# Patient Record
Sex: FEMALE | Race: WHITE
[De-identification: names, ages, dates, MRNs, and addresses within clinical notes are randomized per-mention and may not be internally consistent; named-entity substitution may affect disease eponyms.]

---

## 2017-06-24 ENCOUNTER — HEALTH MAINTENANCE LETTER (OUTPATIENT)
Age: 32
End: 2017-06-24

## 2018-01-30 ENCOUNTER — TELEPHONE (OUTPATIENT)
Dept: OBGYN | Facility: CLINIC | Age: 33
End: 2018-01-30

## 2018-01-30 ENCOUNTER — OFFICE VISIT (OUTPATIENT)
Dept: OBGYN | Facility: CLINIC | Age: 33
End: 2018-01-30

## 2018-01-30 VITALS
BODY MASS INDEX: 34.55 KG/M2 | HEART RATE: 68 BPM | DIASTOLIC BLOOD PRESSURE: 66 MMHG | SYSTOLIC BLOOD PRESSURE: 110 MMHG | WEIGHT: 195 LBS | HEIGHT: 63 IN

## 2018-01-30 DIAGNOSIS — N97.1 TUBAL OCCLUSION: Primary | ICD-10-CM

## 2018-01-30 PROCEDURE — 99499 UNLISTED E&M SERVICE: CPT | Performed by: OBSTETRICS & GYNECOLOGY

## 2018-01-30 ASSESSMENT — PATIENT HEALTH QUESTIONNAIRE - PHQ9: 5. POOR APPETITE OR OVEREATING: NOT AT ALL

## 2018-01-30 ASSESSMENT — ANXIETY QUESTIONNAIRES
GAD7 TOTAL SCORE: 0
5. BEING SO RESTLESS THAT IT IS HARD TO SIT STILL: NOT AT ALL
1. FEELING NERVOUS, ANXIOUS, OR ON EDGE: NOT AT ALL
IF YOU CHECKED OFF ANY PROBLEMS ON THIS QUESTIONNAIRE, HOW DIFFICULT HAVE THESE PROBLEMS MADE IT FOR YOU TO DO YOUR WORK, TAKE CARE OF THINGS AT HOME, OR GET ALONG WITH OTHER PEOPLE: NOT DIFFICULT AT ALL
3. WORRYING TOO MUCH ABOUT DIFFERENT THINGS: NOT AT ALL
2. NOT BEING ABLE TO STOP OR CONTROL WORRYING: NOT AT ALL
7. FEELING AFRAID AS IF SOMETHING AWFUL MIGHT HAPPEN: NOT AT ALL
6. BECOMING EASILY ANNOYED OR IRRITABLE: NOT AT ALL

## 2018-01-30 NOTE — TELEPHONE ENCOUNTER
HSG to be scheduled for Tubal Occlusion    Spoke with Aundrea at [facility]  2/13/2018 12pm [facility] Check In clinic 11:40am    EPIC updated by Angeles VIDES updated by Carol Damon updated by Carol Lange  Surgery Scheduler

## 2018-01-30 NOTE — PROGRESS NOTES
SUBJECTIVE:                                                   Lillian Cardona is a 32 year old female who presents to clinic today for the following health issue(s):  Patient presents with:  Consult: Discuss getting a tubal reversal- referred by OBGYEVAN Araujo- interested in conceiving      HPI: The patient is a 32-year-old  3 with 2  sections and one vaginal delivery.  Who underwent a laparoscopic tubal ligation with Filshie clips.  She subsequently  and is now remarrying and would like to investigate the possibility of reversal.  Her general health is good although she is a little overweight at this time and dieting diligently.      No LMP recorded. Patient is not currently having periods (Reason: Tubal )..   Patient is sexually active, .  Using tubal ligation for contraception.    reports that she has quit smoking. She smoked 0.20 packs per day. She has never used smokeless tobacco.    STD testing offered?  Declined    Health maintenance updated:  yes    Today's PHQ-9 Score:   PHQ-9 SCORE 2018   Total Score 0     Today's JERAD-7 Score:   JERAD-7 SCORE 2018   Total Score 0       Problem list and histories reviewed & adjusted, as indicated.  Additional history: as documented.    Patient Active Problem List   Diagnosis     CARDIOVASCULAR SCREENING; LDL GOAL LESS THAN 160     Past Surgical History:   Procedure Laterality Date     APPENDECTOMY  2006      SECTION  ,      MAMMOPLASTY AUGMENTATION  , 2008     SINUS SURGERY  ,       Social History   Substance Use Topics     Smoking status: Former Smoker     Packs/day: 0.20     Smokeless tobacco: Never Used      Comment: Quit      Alcohol use No      Problem (# of Occurrences) Relation (Name,Age of Onset)    HEART DISEASE (1) Maternal Grandmother    Hypertension (1) Mother            No current outpatient prescriptions on file.     No current facility-administered medications for this visit.   "    Allergies   Allergen Reactions     Demerol Hcl [Meperidine Hcl] Nausea     Demerol      No Clinical Screening - See Comments Nausea and Vomiting     Contrast Dye Nausea and Vomiting     Patient had omnipaque 350 on 5-23-17 with no pretreat and did fine, no reaction.     Latex Rash     Meperidine Hives and Rash     Other reaction(s): Swelling       ROS:  12 point review of systems negative other than symptoms noted below.    OBJECTIVE:     /66  Pulse 68  Ht 5' 3\" (1.6 m)  Wt 195 lb (88.5 kg)  BMI 34.54 kg/m2  Body mass index is 34.54 kg/(m^2).    Exam:  Constitutional:  Appearance: Well nourished, well developed alert, in no acute distress  No Pelvic Exam performed today. Recent yearly     In-Clinic Test Results:    ASSESSMENT/PLAN:                                                      We reviewed the patient's operative note and I am very optimistic that very little tubal damage has occurred due to the use of Filshie clips.  We discussed face to workup which is a hysterosalpingogram to look at proximal segments.  If that is optimal we would then proceed to laparoscopy possible laparotomy for reanastomosis when it is convenient for her work.  She understands the risks and complications of both the workup and the surgery as well as the success rate of 80-85%.  She understands there are no guarantees from the surgery of future fertility.          Beni Bellamy MD  [facility] [place]  "

## 2018-01-30 NOTE — MR AVS SNAPSHOT
"              After Visit Summary   2018    Lillian Cardona    MRN: [id number]           Patient Information     Date Of Birth          1985        Visit Information        Provider Department      2018 2:45 PM Beni Bellamy MD [facility]        Today's Diagnoses     Tubal occlusion    -  1       Follow-ups after your visit        Who to contact     If you have questions or need follow up information about today's clinic visit or your schedule please contact [facility] directly at [phone].  Normal or non-critical lab and imaging results will be communicated to you by [employer], letter or phone within 4 business days after the clinic has received the results. If you do not hear from us within 7 days, please contact the clinic through [employer] or phone. If you have a critical or abnormal lab result, we will notify you by phone as soon as possible.  Submit refill requests through [employer] or call your pharmacy and they will forward the refill request to us. Please allow 3 business days for your refill to be completed.          Additional Information About Your Visit        MyChart Information     [employer]Raven lets you send messages to your doctor, view your test results, renew your prescriptions, schedule appointments and more. To sign up, go to [email or web address] . Click on \"Log in\" on the left side of the screen, which will take you to the Welcome page. Then click on \"Sign up Now\" on the right side of the page.     You will be asked to enter the access code listed below, as well as some personal information. Please follow the directions to create your username and password.     Your access code is: [email or web address]  Expires: 2018  2:55 PM     Your access code will  in 90 days. If you need help or a new code, please call your [place] clinic or [phone].        Care EveryWhere ID     This is your Care EveryWhere ID. This could be used by other " "organizations to access your [place] medical records  [id number]        Your Vitals Were     Pulse Height BMI (Body Mass Index)             68 5' 3\" (1.6 m) 34.54 kg/m2          Blood Pressure from Last 3 Encounters:   01/30/18 110/66   11/01/12 129/79   01/13/10 133/93    Weight from Last 3 Encounters:   01/30/18 195 lb (88.5 kg)   01/13/10 154 lb (69.9 kg)              Today, you had the following     No orders found for display       Primary Care Provider Office Phone # Fax #    Lupillo Christiansen [phone]       XXX RESIGNED XXX [id number] [place] [facility] [address]        Equal Access to Services     MICAELA [place]ST[place] : Hadii aad papito hadasho Soomaali, waaxda luqadaha, qaybta kaalmada adeegyada, waxay idiin haygeri perez . So [facility] [phone].    ATENCIÓN: Si habla español, tiene a valentin disposición servicios gratuitos de asistencia lingüística. Llame al [phone].    We comply with applicable federal civil rights laws and [place] laws. We do not discriminate on the basis of race, color, national origin, age, disability, sex, sexual orientation, or gender identity.            Thank you!     Thank you for choosing [facility] [place]  for your care. Our goal is always to provide you with excellent care. Hearing back from our patients is one way we can continue to improve our services. Please take a few minutes to complete the written survey that you may receive in the mail after your visit with us. Thank you!             Your Updated Medication List - Protect others around you: Learn how to safely use, store and throw away your medicines at www.disposemymeds.org.      Notice  As of 1/30/2018  2:55 PM    You have not been prescribed any medications.      "

## 2018-01-31 ASSESSMENT — ANXIETY QUESTIONNAIRES: GAD7 TOTAL SCORE: 0

## 2018-01-31 ASSESSMENT — PATIENT HEALTH QUESTIONNAIRE - PHQ9: SUM OF ALL RESPONSES TO PHQ QUESTIONS 1-9: 0

## 2018-02-13 ENCOUNTER — OFFICE VISIT (OUTPATIENT)
Dept: OBGYN | Facility: CLINIC | Age: 33
End: 2018-02-13

## 2018-02-13 ENCOUNTER — TRANSFERRED RECORDS (OUTPATIENT)
Dept: HEALTH INFORMATION MANAGEMENT | Facility: CLINIC | Age: 33
End: 2018-02-13

## 2018-02-13 ENCOUNTER — TELEPHONE (OUTPATIENT)
Dept: OBGYN | Facility: CLINIC | Age: 33
End: 2018-02-13

## 2018-02-13 DIAGNOSIS — N97.1 TUBAL OCCLUSION: Primary | ICD-10-CM

## 2018-02-13 PROCEDURE — 58340 CATHETER FOR HYSTEROGRAPHY: CPT | Performed by: OBSTETRICS & GYNECOLOGY

## 2018-02-13 NOTE — PROGRESS NOTES
The patient is seen at this time for second stage workup of possible tubal reanastomosis.  She is consented for a hysterosalpingogram after all risks and complications were reviewed.  She is placed in the dorsolithotomy position on the fluoroscopy table.  A speculum was placed and the entire vagina and cervix are prepped.  A single-tooth tenaculum was placed on the anterior cervical lip.  A Scott cannula was placed and under fluoroscopic control dye was injected.  Normal cavity appeared.  The patient had at least 4 cm of 2 bleeding up to clips on each side.  The Filshie clips were clearly identified.  The results were shared with the patient.  She tolerated this well and will discuss third phase workup with us when appropriate.

## 2018-02-13 NOTE — TELEPHONE ENCOUNTER
FROM VERBAL AS NO SX REQUEST IN PT CHART    TUBAL REVERSAL  DX Tubal Occlusion    Patient surgery scheduled on 3/27/2018 at 7:20am Check in 6am  Location for surgery to performed:   Surgery Outpatient  Scheduled by Keegan 2/13/2018     Information Packet given :Yes: HANDED 2/13/2018    CPT codes given: No            Consents signed? N/A  Rep Informed :N/A    PREOP DATE :  3/20/2018  In Epic :Yes    On Spreadsheet :Yes    On Calendar EB  :Yes    In [place] Calendar JOSHUA  :No    Assist NONE   Assist in Epic NA  Assist Notified as needed :No     Carol Lange  Surgery Scheduler

## 2018-02-13 NOTE — MR AVS SNAPSHOT
"              After Visit Summary   2/13/2018    Lillian Cardona    MRN: [id number]           Patient Information     Date Of Birth          1985        Visit Information        Provider Department      2/13/2018 12:00 PM Beni Bellamy MD [facility]        Today's Diagnoses     Tubal occlusion    -  1       Follow-ups after your visit        Your next 10 appointments already scheduled     Mar 20, 2018  1:45 PM CDT   SHORT with Beni Bellamy MD   [facility] [place] ([facility])    [address]  [address]  [place] [id number] [phone]            Mar 27, 2018   Procedure with Beni Bellamy MD   [facility] (--)    [address]  [facility] [id number] [phone]              Who to contact     If you have questions or need follow up information about today's clinic visit or your schedule please contact [facility] directly at [phone].  Normal or non-critical lab and imaging results will be communicated to you by [employer], letter or phone within 4 business days after the clinic has received the results. If you do not hear from us within 7 days, please contact the clinic through [employer] or phone. If you have a critical or abnormal lab result, we will notify you by phone as soon as possible.  Submit refill requests through [employer] or call your pharmacy and they will forward the refill request to us. Please allow 3 business days for your refill to be completed.          Additional Information About Your Visit        [employer] Information     [employer] lets you send messages to your doctor, view your test results, renew your prescriptions, schedule appointments and more. To sign up, go to [email or web address] . Click on \"Log in\" on the left side of the screen, which will take you to the Welcome page. Then click on \"Sign up Now\" on the right side of the page.     You will be " asked to enter the access code listed below, as well as some personal information. Please follow the directions to create your username and password.     Your access code is: [email or web address]  Expires: 2018  2:55 PM     Your access code will  in 90 days. If you need help or a new code, please call your [place] clinic or [phone].        Care EveryWhere ID     This is your Care EveryWhere ID. This could be used by other organizations to access your [place] medical records  [id number]         Blood Pressure from Last 3 Encounters:   18 110/66   12 129/79   01/13/10 133/93    Weight from Last 3 Encounters:   18 195 lb (88.5 kg)   01/13/10 154 lb (69.9 kg)              Today, you had the following     No orders found for display       Primary Care Provider Office Phone # Fax #    Lupillo Christiansen [phone]       XXX RESIGNED XXX [address]  [facility] [address]        Equal Access to Services     [facility]: Hadii aad ku hadasho Soomaali, waaxda luqadaha, qaybta kaalmada adeegyada, lefty perez . So [facility] [phone].    ATENCIÓN: Si habla español, tiene a valentin disposición servicios gratuitos de asistencia lingüística. Gerson al [phone].    We comply with applicable federal civil rights laws and [place] laws. We do not discriminate on the basis of race, color, national origin, age, disability, sex, sexual orientation, or gender identity.            Thank you!     Thank you for choosing [facility] [place]  for your care. Our goal is always to provide you with excellent care. Hearing back from our patients is one way we can continue to improve our services. Please take a few minutes to complete the written survey that you may receive in the mail after your visit with us. Thank you!             Your Updated Medication List - Protect others around you: Learn how to safely use, store and throw away your medicines at  www.disposemymeds.org.      Notice  As of 2/13/2018 12:27 PM    You have not been prescribed any medications.

## 2018-03-13 ENCOUNTER — OFFICE VISIT (OUTPATIENT)
Dept: OBGYN | Facility: CLINIC | Age: 33
End: 2018-03-13

## 2018-03-13 VITALS
HEART RATE: 64 BPM | WEIGHT: 187 LBS | BODY MASS INDEX: 33.13 KG/M2 | SYSTOLIC BLOOD PRESSURE: 108 MMHG | DIASTOLIC BLOOD PRESSURE: 72 MMHG | HEIGHT: 63 IN

## 2018-03-13 DIAGNOSIS — Z01.812 PRE-OPERATIVE LABORATORY EXAMINATION: Primary | ICD-10-CM

## 2018-03-13 DIAGNOSIS — N97.1 TUBAL OCCLUSION: Primary | ICD-10-CM

## 2018-03-13 LAB — HGB BLD-MCNC: 14.6 G/DL (ref 11.7–15.7)

## 2018-03-13 PROCEDURE — 85018 HEMOGLOBIN: CPT | Performed by: OBSTETRICS & GYNECOLOGY

## 2018-03-13 PROCEDURE — 36415 COLL VENOUS BLD VENIPUNCTURE: CPT | Performed by: OBSTETRICS & GYNECOLOGY

## 2018-03-13 PROCEDURE — 99499 UNLISTED E&M SERVICE: CPT | Performed by: OBSTETRICS & GYNECOLOGY

## 2018-03-13 NOTE — PROGRESS NOTES
SUBJECTIVE:                                                   Lillian Cardona is a 33 year old female who presents to clinic today for the following health issue(s):  Patient presents with:  Pre-Op Exam: scheduled for tubal reversal on 3/27      HPI: The patient is seen at this time for preoperative clearance for a laparoscopy possible laparotomy for reanastomosis.  Hysterosalpingogram has shown adequate proximal segments.  We reviewed her outside operative note and feel that she is a good candidate.  She understands risks and complications of the procedure as well as the patency rate.      Patient's last menstrual period was 2018 (approximate)..   Patient is sexually active, .  Using tubal ligation for contraception.    reports that she has quit smoking. She smoked 0.20 packs per day. She has never used smokeless tobacco.    STD testing offered?  Declined    Health maintenance updated:  yes    Today's PHQ-2 Score: No flowsheet data found.  Today's PHQ-9 Score:   PHQ-9 SCORE 2018   Total Score 0     Today's JERAD-7 Score:   JERAD-7 SCORE 2018   Total Score 0       Problem list and histories reviewed & adjusted, as indicated.  Additional history: as documented.    Patient Active Problem List   Diagnosis     CARDIOVASCULAR SCREENING; LDL GOAL LESS THAN 160     Past Surgical History:   Procedure Laterality Date     APPENDECTOMY  2006      SECTION  ,      MAMMOPLASTY AUGMENTATION  , 2008     SINUS SURGERY  ,       Social History   Substance Use Topics     Smoking status: Former Smoker     Packs/day: 0.20     Smokeless tobacco: Never Used      Comment: Quit      Alcohol use No      Problem (# of Occurrences) Relation (Name,Age of Onset)    HEART DISEASE (1) Maternal Grandmother    Hypertension (1) Mother            No current outpatient prescriptions on file.     No current facility-administered medications for this visit.      Allergies   Allergen Reactions  "    Demerol Hcl [Meperidine Hcl] Nausea     Demerol      No Clinical Screening - See Comments Nausea and Vomiting     Contrast Dye Nausea and Vomiting     Patient had omnipaque 350 on 5-23-17 with no pretreat and did fine, no reaction.     Latex Rash     Meperidine Hives and Rash     Other reaction(s): Swelling         OBJECTIVE:     /72  Pulse 64  Ht 5' 3\" (1.6 m)  Wt 187 lb (84.8 kg)  LMP 03/06/2018 (Approximate)  BMI 33.13 kg/m2  Body mass index is 33.13 kg/(m^2).    Exam:  Constitutional:  Appearance: Well nourished, well developed alert, in no acute distress  Neck:  Lymph Nodes:  No lymphadenopathy present; Thyroid:  Gland size normal, nontender, no nodules or masses present on palpation  Chest:  Respiratory Effort:  Breathing unlabored  Cardiovascular: Heart: Auscultation:  Regular rate, normal rhythm, no murmurs present  Gastrointestinal:  Abdominal Examination:  Abdomen nontender to palpation, tone normal without rigidity or guarding, no masses present, umbilicus without lesions; Liver/Spleen:  No hepatomegaly present, liver nontender to palpation; Hernias:  No hernias present  Lymphatic: Lymph Nodes:  No other lymphadenopathy present  Skin:General Inspection:  No rashes present, no lesions present, no areas of discoloration; Genitalia and Groin:  No rashes present, no lesions present, no areas of discoloration, no masses present.  Neurologic/Psychiatric:  Mental Status:  Oriented X3   No Pelvic Exam performed     In-Clinic Test Results:  Results for orders placed or performed in visit on 03/13/18 (from the past 24 hour(s))   Hemoglobin   Result Value Ref Range    Hemoglobin 14.6 11.7 - 15.7 g/dL       ASSESSMENT/PLAN:                                                        The patient is in excellent health and is well consented for the procedure.  She is to call if she has any issues with cold or flu between now and surgery date.          Beni Bellamy MD  [facility]  "

## 2018-03-13 NOTE — MR AVS SNAPSHOT
"              After Visit Summary   3/13/2018    Lillian Cardona    MRN: [id number]           Patient Information     Date Of Birth          1985        Visit Information        Provider Department      3/13/2018 1:45 PM Beni Bellamy MD [facility]        Today's Diagnoses     Tubal occlusion    -  1       Follow-ups after your visit        Your next 10 appointments already scheduled     Mar 27, 2018   Procedure with Beni Bellamy MD   [facility] (--)    [address]  [facility] [id number] [phone]              Who to contact     If you have questions or need follow up information about today's clinic visit or your schedule please contact [facility] directly at [phone].  Normal or non-critical lab and imaging results will be communicated to you by MyChart, letter or phone within 4 business days after the clinic has received the results. If you do not hear from us within 7 days, please contact the clinic through MyChart or phone. If you have a critical or abnormal lab result, we will notify you by phone as soon as possible.  Submit refill requests through [employer] or call your pharmacy and they will forward the refill request to us. Please allow 3 business days for your refill to be completed.          Additional Information About Your Visit        MyChart Information     [employer]isha lets you send messages to your doctor, view your test results, renew your prescriptions, schedule appointments and more. To sign up, go to [email or web address] . Click on \"Log in\" on the left side of the screen, which will take you to the Welcome page. Then click on \"Sign up Now\" on the right side of the page.     You will be asked to enter the access code listed below, as well as some personal information. Please follow the directions to create your username and password.     Your access code is: [email or web address]  Expires: 4/30/2018  3:55 " "PM     Your access code will  in 90 days. If you need help or a new code, please call your [place] clinic or [phone].        Care EveryWhere ID     This is your Care EveryWhere ID. This could be used by other organizations to access your [place] medical records  [id number]        Your Vitals Were     Pulse Height Last Period BMI (Body Mass Index)          64 5' 3\" (1.6 m) 2018 (Approximate) 33.13 kg/m2         Blood Pressure from Last 3 Encounters:   18 108/72   18 110/66   12 129/79    Weight from Last 3 Encounters:   18 187 lb (84.8 kg)   18 195 lb (88.5 kg)   01/13/10 154 lb (69.9 kg)              Today, you had the following     No orders found for display       Primary Care Provider Office Phone # Fax #    Lupillo Christiansen [phone]       XXX RESIGNED XXX [address]  [facility] [address]        Equal Access to Services     [facility]: Hadii aad ku hadasho Soomaali, waaxda luqadaha, qaybta kaalmada adeegyagorge, lefty perez . So [facility] [phone].    ATENCIÓN: Si habla español, tiene a valentin disposición servicios gratuitos de asistencia lingüística. Gerson al [phone].    We comply with applicable federal civil rights laws and [place] laws. We do not discriminate on the basis of race, color, national origin, age, disability, sex, sexual orientation, or gender identity.            Thank you!     Thank you for choosing [facility] [place]  for your care. Our goal is always to provide you with excellent care. Hearing back from our patients is one way we can continue to improve our services. Please take a few minutes to complete the written survey that you may receive in the mail after your visit with us. Thank you!             Your Updated Medication List - Protect others around you: Learn how to safely use, store and throw away your medicines at www.disposemymeds.org.      Notice  As of " 3/13/2018  2:24 PM    You have not been prescribed any medications.

## 2018-03-22 NOTE — H&P (VIEW-ONLY)
SUBJECTIVE:                                                   Lillian Cardona is a 33 year old female who presents to clinic today for the following health issue(s):  Patient presents with:  Pre-Op Exam: scheduled for tubal reversal on 3/27      HPI: The patient is seen at this time for preoperative clearance for a laparoscopy possible laparotomy for reanastomosis.  Hysterosalpingogram has shown adequate proximal segments.  We reviewed her outside operative note and feel that she is a good candidate.  She understands risks and complications of the procedure as well as the patency rate.      Patient's last menstrual period was 2018 (approximate)..   Patient is sexually active, .  Using tubal ligation for contraception.    reports that she has quit smoking. She smoked 0.20 packs per day. She has never used smokeless tobacco.    STD testing offered?  Declined    Health maintenance updated:  yes    Today's PHQ-2 Score: No flowsheet data found.  Today's PHQ-9 Score:   PHQ-9 SCORE 2018   Total Score 0     Today's JERAD-7 Score:   JERAD-7 SCORE 2018   Total Score 0       Problem list and histories reviewed & adjusted, as indicated.  Additional history: as documented.    Patient Active Problem List   Diagnosis     CARDIOVASCULAR SCREENING; LDL GOAL LESS THAN 160     Past Surgical History:   Procedure Laterality Date     APPENDECTOMY  2006      SECTION  ,      MAMMOPLASTY AUGMENTATION  , 2008     SINUS SURGERY  ,       Social History   Substance Use Topics     Smoking status: Former Smoker     Packs/day: 0.20     Smokeless tobacco: Never Used      Comment: Quit      Alcohol use No      Problem (# of Occurrences) Relation (Name,Age of Onset)    HEART DISEASE (1) Maternal Grandmother    Hypertension (1) Mother            No current outpatient prescriptions on file.     No current facility-administered medications for this visit.      Allergies   Allergen Reactions  "    Demerol Hcl [Meperidine Hcl] Nausea     Demerol      No Clinical Screening - See Comments Nausea and Vomiting     Contrast Dye Nausea and Vomiting     Patient had omnipaque 350 on 5-23-17 with no pretreat and did fine, no reaction.     Latex Rash     Meperidine Hives and Rash     Other reaction(s): Swelling         OBJECTIVE:     /72  Pulse 64  Ht 5' 3\" (1.6 m)  Wt 187 lb (84.8 kg)  LMP 03/06/2018 (Approximate)  BMI 33.13 kg/m2  Body mass index is 33.13 kg/(m^2).    Exam:  Constitutional:  Appearance: Well nourished, well developed alert, in no acute distress  Neck:  Lymph Nodes:  No lymphadenopathy present; Thyroid:  Gland size normal, nontender, no nodules or masses present on palpation  Chest:  Respiratory Effort:  Breathing unlabored  Cardiovascular: Heart: Auscultation:  Regular rate, normal rhythm, no murmurs present  Gastrointestinal:  Abdominal Examination:  Abdomen nontender to palpation, tone normal without rigidity or guarding, no masses present, umbilicus without lesions; Liver/Spleen:  No hepatomegaly present, liver nontender to palpation; Hernias:  No hernias present  Lymphatic: Lymph Nodes:  No other lymphadenopathy present  Skin:General Inspection:  No rashes present, no lesions present, no areas of discoloration; Genitalia and Groin:  No rashes present, no lesions present, no areas of discoloration, no masses present.  Neurologic/Psychiatric:  Mental Status:  Oriented X3   No Pelvic Exam performed     In-Clinic Test Results:  Results for orders placed or performed in visit on 03/13/18 (from the past 24 hour(s))   Hemoglobin   Result Value Ref Range    Hemoglobin 14.6 11.7 - 15.7 g/dL       ASSESSMENT/PLAN:                                                        The patient is in excellent health and is well consented for the procedure.  She is to call if she has any issues with cold or flu between now and surgery date.          Beni Bellamy MD  [facility]  "

## 2018-03-23 ENCOUNTER — ANESTHESIA EVENT (OUTPATIENT)
Dept: SURGERY | Facility: CLINIC | Age: 33
End: 2018-03-23

## 2018-03-26 RX ORDER — PHENAZOPYRIDINE HYDROCHLORIDE 200 MG/1
200 TABLET, FILM COATED ORAL ONCE
Status: CANCELLED | OUTPATIENT
Start: 2018-03-26 | End: 2018-03-26

## 2018-03-27 ENCOUNTER — HOSPITAL ENCOUNTER (OUTPATIENT)
Facility: CLINIC | Age: 33
Discharge: HOME | End: 2018-03-27
Attending: OBSTETRICS & GYNECOLOGY | Admitting: OBSTETRICS & GYNECOLOGY

## 2018-03-27 ENCOUNTER — NURSE TRIAGE (OUTPATIENT)
Dept: NURSING | Facility: CLINIC | Age: 33
End: 2018-03-27

## 2018-03-27 ENCOUNTER — ANESTHESIA (OUTPATIENT)
Dept: SURGERY | Facility: CLINIC | Age: 33
End: 2018-03-27

## 2018-03-27 ENCOUNTER — SURGERY (OUTPATIENT)
Age: 33
End: 2018-03-27

## 2018-03-27 VITALS
TEMPERATURE: 98.7 F | HEIGHT: 64 IN | SYSTOLIC BLOOD PRESSURE: 98 MMHG | RESPIRATION RATE: 16 BRPM | WEIGHT: 192 LBS | BODY MASS INDEX: 32.78 KG/M2 | DIASTOLIC BLOOD PRESSURE: 61 MMHG | OXYGEN SATURATION: 99 %

## 2018-03-27 DIAGNOSIS — N97.1 TUBAL OCCLUSION: Primary | ICD-10-CM

## 2018-03-27 LAB
B-HCG SERPL-ACNC: <1 IU/L (ref 0–5)
COPATH REPORT: NORMAL

## 2018-03-27 PROCEDURE — 88300 SURGICAL PATH GROSS: CPT | Performed by: OBSTETRICS & GYNECOLOGY

## 2018-03-27 PROCEDURE — 25000128 H RX IP 250 OP 636: Performed by: OBSTETRICS & GYNECOLOGY

## 2018-03-27 PROCEDURE — 25000128 H RX IP 250 OP 636

## 2018-03-27 PROCEDURE — 40000170 ZZH STATISTIC PRE-PROCEDURE ASSESSMENT II: Performed by: OBSTETRICS & GYNECOLOGY

## 2018-03-27 PROCEDURE — 37000008 ZZH ANESTHESIA TECHNICAL FEE, 1ST 30 MIN: Performed by: OBSTETRICS & GYNECOLOGY

## 2018-03-27 PROCEDURE — 36000058 ZZH SURGERY LEVEL 3 EA 15 ADDTL MIN: Performed by: OBSTETRICS & GYNECOLOGY

## 2018-03-27 PROCEDURE — 36000056 ZZH SURGERY LEVEL 3 1ST 30 MIN: Performed by: OBSTETRICS & GYNECOLOGY

## 2018-03-27 PROCEDURE — 25000128 H RX IP 250 OP 636: Performed by: ANESTHESIOLOGY

## 2018-03-27 PROCEDURE — 25000566 ZZH SEVOFLURANE, EA 15 MIN: Performed by: OBSTETRICS & GYNECOLOGY

## 2018-03-27 PROCEDURE — 84702 CHORIONIC GONADOTROPIN TEST: CPT | Performed by: ANESTHESIOLOGY

## 2018-03-27 PROCEDURE — 27210794 ZZH OR GENERAL SUPPLY STERILE: Performed by: OBSTETRICS & GYNECOLOGY

## 2018-03-27 PROCEDURE — 88300 SURGICAL PATH GROSS: CPT | Mod: 26 | Performed by: OBSTETRICS & GYNECOLOGY

## 2018-03-27 PROCEDURE — 71000012 ZZH RECOVERY PHASE 1 LEVEL 1 FIRST HR: Performed by: OBSTETRICS & GYNECOLOGY

## 2018-03-27 PROCEDURE — 36415 COLL VENOUS BLD VENIPUNCTURE: CPT | Performed by: ANESTHESIOLOGY

## 2018-03-27 PROCEDURE — 25000125 ZZHC RX 250: Performed by: OBSTETRICS & GYNECOLOGY

## 2018-03-27 PROCEDURE — 37000009 ZZH ANESTHESIA TECHNICAL FEE, EACH ADDTL 15 MIN: Performed by: OBSTETRICS & GYNECOLOGY

## 2018-03-27 PROCEDURE — 25000132 ZZH RX MED GY IP 250 OP 250 PS 637: Performed by: OBSTETRICS & GYNECOLOGY

## 2018-03-27 PROCEDURE — 71000013 ZZH RECOVERY PHASE 1 LEVEL 1 EA ADDTL HR: Performed by: OBSTETRICS & GYNECOLOGY

## 2018-03-27 PROCEDURE — 27211024 ZZHC OR SUPPLY OTHER OPNP: Performed by: OBSTETRICS & GYNECOLOGY

## 2018-03-27 PROCEDURE — 25000125 ZZHC RX 250

## 2018-03-27 PROCEDURE — 40000935 ZZH STATISTIC OUTPATIENT (NON-OBS) EVE

## 2018-03-27 PROCEDURE — 40000934 ZZH STATISTIC OUTPATIENT (NON-OBS) DAY

## 2018-03-27 PROCEDURE — 58750 REPAIR OVIDUCT: CPT | Performed by: OBSTETRICS & GYNECOLOGY

## 2018-03-27 RX ORDER — CEFAZOLIN SODIUM 2 G/100ML
2 INJECTION, SOLUTION INTRAVENOUS EVERY 8 HOURS
Status: DISCONTINUED | OUTPATIENT
Start: 2018-03-27 | End: 2018-03-28 | Stop reason: HOSPADM

## 2018-03-27 RX ORDER — MEPERIDINE HYDROCHLORIDE 25 MG/ML
12.5 INJECTION INTRAMUSCULAR; INTRAVENOUS; SUBCUTANEOUS
Status: DISCONTINUED | OUTPATIENT
Start: 2018-03-27 | End: 2018-03-27 | Stop reason: HOSPADM

## 2018-03-27 RX ORDER — SODIUM CHLORIDE, SODIUM LACTATE, POTASSIUM CHLORIDE, CALCIUM CHLORIDE 600; 310; 30; 20 MG/100ML; MG/100ML; MG/100ML; MG/100ML
INJECTION, SOLUTION INTRAVENOUS CONTINUOUS
Status: DISCONTINUED | OUTPATIENT
Start: 2018-03-27 | End: 2018-03-27 | Stop reason: HOSPADM

## 2018-03-27 RX ORDER — HYDROMORPHONE HYDROCHLORIDE 1 MG/ML
.3-.5 INJECTION, SOLUTION INTRAMUSCULAR; INTRAVENOUS; SUBCUTANEOUS EVERY 10 MIN PRN
Status: DISCONTINUED | OUTPATIENT
Start: 2018-03-27 | End: 2018-03-27 | Stop reason: HOSPADM

## 2018-03-27 RX ORDER — ONDANSETRON 2 MG/ML
4 INJECTION INTRAMUSCULAR; INTRAVENOUS EVERY 6 HOURS PRN
Status: DISCONTINUED | OUTPATIENT
Start: 2018-03-27 | End: 2018-03-28 | Stop reason: HOSPADM

## 2018-03-27 RX ORDER — PROPOFOL 10 MG/ML
INJECTION, EMULSION INTRAVENOUS CONTINUOUS PRN
Status: DISCONTINUED | OUTPATIENT
Start: 2018-03-27 | End: 2018-03-27

## 2018-03-27 RX ORDER — LIDOCAINE HYDROCHLORIDE 20 MG/ML
INJECTION, SOLUTION INFILTRATION; PERINEURAL PRN
Status: DISCONTINUED | OUTPATIENT
Start: 2018-03-27 | End: 2018-03-27

## 2018-03-27 RX ORDER — OXYCODONE AND ACETAMINOPHEN 5; 325 MG/1; MG/1
1-2 TABLET ORAL EVERY 4 HOURS PRN
Qty: 40 TABLET | Refills: 0 | Status: SHIPPED | OUTPATIENT
Start: 2018-03-27 | End: 2018-04-02

## 2018-03-27 RX ORDER — BUPIVACAINE HYDROCHLORIDE 2.5 MG/ML
INJECTION, SOLUTION EPIDURAL; INFILTRATION; INTRACAUDAL
Status: DISCONTINUED
Start: 2018-03-27 | End: 2018-03-27 | Stop reason: HOSPADM

## 2018-03-27 RX ORDER — BUPIVACAINE HYDROCHLORIDE 2.5 MG/ML
INJECTION, SOLUTION INFILTRATION; PERINEURAL PRN
Status: DISCONTINUED | OUTPATIENT
Start: 2018-03-27 | End: 2018-03-27 | Stop reason: HOSPADM

## 2018-03-27 RX ORDER — ONDANSETRON 4 MG/1
4 TABLET, ORALLY DISINTEGRATING ORAL EVERY 6 HOURS PRN
Status: DISCONTINUED | OUTPATIENT
Start: 2018-03-27 | End: 2018-03-28 | Stop reason: HOSPADM

## 2018-03-27 RX ORDER — NEOSTIGMINE METHYLSULFATE 1 MG/ML
VIAL (ML) INJECTION PRN
Status: DISCONTINUED | OUTPATIENT
Start: 2018-03-27 | End: 2018-03-27

## 2018-03-27 RX ORDER — GLYCOPYRROLATE 0.2 MG/ML
INJECTION, SOLUTION INTRAMUSCULAR; INTRAVENOUS PRN
Status: DISCONTINUED | OUTPATIENT
Start: 2018-03-27 | End: 2018-03-27

## 2018-03-27 RX ORDER — DEXAMETHASONE SODIUM PHOSPHATE 4 MG/ML
INJECTION, SOLUTION INTRA-ARTICULAR; INTRALESIONAL; INTRAMUSCULAR; INTRAVENOUS; SOFT TISSUE PRN
Status: DISCONTINUED | OUTPATIENT
Start: 2018-03-27 | End: 2018-03-27

## 2018-03-27 RX ORDER — PHYSOSTIGMINE SALICYLATE 1 MG/ML
1.2 INJECTION INTRAVENOUS
Status: DISCONTINUED | OUTPATIENT
Start: 2018-03-27 | End: 2018-03-27 | Stop reason: HOSPADM

## 2018-03-27 RX ORDER — LIDOCAINE HYDROCHLORIDE AND EPINEPHRINE 10; 10 MG/ML; UG/ML
INJECTION, SOLUTION INFILTRATION; PERINEURAL
Status: DISCONTINUED
Start: 2018-03-27 | End: 2018-03-27 | Stop reason: WASHOUT

## 2018-03-27 RX ORDER — HEPARIN SODIUM 1000 [USP'U]/ML
INJECTION, SOLUTION INTRAVENOUS; SUBCUTANEOUS
Status: DISCONTINUED
Start: 2018-03-27 | End: 2018-03-27 | Stop reason: HOSPADM

## 2018-03-27 RX ORDER — KETOROLAC TROMETHAMINE 30 MG/ML
30 INJECTION, SOLUTION INTRAMUSCULAR; INTRAVENOUS ONCE
Status: COMPLETED | OUTPATIENT
Start: 2018-03-27 | End: 2018-03-27

## 2018-03-27 RX ORDER — ACETAMINOPHEN 10 MG/ML
1000 INJECTION, SOLUTION INTRAVENOUS ONCE
Status: COMPLETED | OUTPATIENT
Start: 2018-03-27 | End: 2018-03-27

## 2018-03-27 RX ORDER — ONDANSETRON 4 MG/1
4 TABLET, FILM COATED ORAL EVERY 6 HOURS PRN
Qty: 10 TABLET | Refills: 0 | Status: SHIPPED | OUTPATIENT
Start: 2018-03-27 | End: 2018-04-09

## 2018-03-27 RX ORDER — CEFAZOLIN SODIUM 1 G
1 VIAL (EA) INJECTION SEE ADMIN INSTRUCTIONS
Status: DISCONTINUED | OUTPATIENT
Start: 2018-03-27 | End: 2018-03-27 | Stop reason: HOSPADM

## 2018-03-27 RX ORDER — FENTANYL CITRATE 50 UG/ML
INJECTION, SOLUTION INTRAMUSCULAR; INTRAVENOUS PRN
Status: DISCONTINUED | OUTPATIENT
Start: 2018-03-27 | End: 2018-03-27

## 2018-03-27 RX ORDER — LIDOCAINE 40 MG/G
CREAM TOPICAL
Status: DISCONTINUED | OUTPATIENT
Start: 2018-03-27 | End: 2018-03-28 | Stop reason: HOSPADM

## 2018-03-27 RX ORDER — ONDANSETRON 4 MG/1
4 TABLET, ORALLY DISINTEGRATING ORAL EVERY 30 MIN PRN
Status: DISCONTINUED | OUTPATIENT
Start: 2018-03-27 | End: 2018-03-27 | Stop reason: HOSPADM

## 2018-03-27 RX ORDER — OXYCODONE AND ACETAMINOPHEN 5; 325 MG/1; MG/1
1-2 TABLET ORAL EVERY 4 HOURS PRN
Status: DISCONTINUED | OUTPATIENT
Start: 2018-03-27 | End: 2018-03-28 | Stop reason: HOSPADM

## 2018-03-27 RX ORDER — KETOROLAC TROMETHAMINE 30 MG/ML
30 INJECTION, SOLUTION INTRAMUSCULAR; INTRAVENOUS ONCE
Status: DISCONTINUED | OUTPATIENT
Start: 2018-03-27 | End: 2018-03-28 | Stop reason: HOSPADM

## 2018-03-27 RX ORDER — ONDANSETRON 2 MG/ML
4 INJECTION INTRAMUSCULAR; INTRAVENOUS EVERY 30 MIN PRN
Status: DISCONTINUED | OUTPATIENT
Start: 2018-03-27 | End: 2018-03-27 | Stop reason: HOSPADM

## 2018-03-27 RX ORDER — CEFAZOLIN SODIUM 2 G/100ML
2 INJECTION, SOLUTION INTRAVENOUS
Status: COMPLETED | OUTPATIENT
Start: 2018-03-27 | End: 2018-03-27

## 2018-03-27 RX ORDER — ONDANSETRON 2 MG/ML
INJECTION INTRAMUSCULAR; INTRAVENOUS PRN
Status: DISCONTINUED | OUTPATIENT
Start: 2018-03-27 | End: 2018-03-27

## 2018-03-27 RX ORDER — HYDROMORPHONE HYDROCHLORIDE 1 MG/ML
0.2 INJECTION, SOLUTION INTRAMUSCULAR; INTRAVENOUS; SUBCUTANEOUS
Status: DISCONTINUED | OUTPATIENT
Start: 2018-03-27 | End: 2018-03-28 | Stop reason: HOSPADM

## 2018-03-27 RX ORDER — SODIUM CHLORIDE 9 MG/ML
INJECTION, SOLUTION INTRAVENOUS CONTINUOUS
Status: DISCONTINUED | OUTPATIENT
Start: 2018-03-27 | End: 2018-03-28 | Stop reason: HOSPADM

## 2018-03-27 RX ORDER — PROPOFOL 10 MG/ML
INJECTION, EMULSION INTRAVENOUS PRN
Status: DISCONTINUED | OUTPATIENT
Start: 2018-03-27 | End: 2018-03-27

## 2018-03-27 RX ORDER — FENTANYL CITRATE 50 UG/ML
25-50 INJECTION, SOLUTION INTRAMUSCULAR; INTRAVENOUS
Status: DISCONTINUED | OUTPATIENT
Start: 2018-03-27 | End: 2018-03-27 | Stop reason: HOSPADM

## 2018-03-27 RX ORDER — NALOXONE HYDROCHLORIDE 0.4 MG/ML
.1-.4 INJECTION, SOLUTION INTRAMUSCULAR; INTRAVENOUS; SUBCUTANEOUS
Status: DISCONTINUED | OUTPATIENT
Start: 2018-03-27 | End: 2018-03-28 | Stop reason: HOSPADM

## 2018-03-27 RX ORDER — FENTANYL CITRATE 50 UG/ML
25-50 INJECTION, SOLUTION INTRAMUSCULAR; INTRAVENOUS EVERY 5 MIN PRN
Status: DISCONTINUED | OUTPATIENT
Start: 2018-03-27 | End: 2018-03-27 | Stop reason: HOSPADM

## 2018-03-27 RX ORDER — NALOXONE HYDROCHLORIDE 0.4 MG/ML
.1-.4 INJECTION, SOLUTION INTRAMUSCULAR; INTRAVENOUS; SUBCUTANEOUS
Status: DISCONTINUED | OUTPATIENT
Start: 2018-03-27 | End: 2018-03-27 | Stop reason: HOSPADM

## 2018-03-27 RX ORDER — PROCHLORPERAZINE MALEATE 10 MG
10 TABLET ORAL EVERY 6 HOURS PRN
Status: DISCONTINUED | OUTPATIENT
Start: 2018-03-27 | End: 2018-03-28 | Stop reason: HOSPADM

## 2018-03-27 RX ORDER — SODIUM CHLORIDE, SODIUM LACTATE, POTASSIUM CHLORIDE, CALCIUM CHLORIDE 600; 310; 30; 20 MG/100ML; MG/100ML; MG/100ML; MG/100ML
INJECTION, SOLUTION INTRAVENOUS CONTINUOUS PRN
Status: DISCONTINUED | OUTPATIENT
Start: 2018-03-27 | End: 2018-03-27

## 2018-03-27 RX ADMIN — SODIUM CHLORIDE: 9 INJECTION, SOLUTION INTRAVENOUS at 12:34

## 2018-03-27 RX ADMIN — ONDANSETRON 4 MG: 4 TABLET, ORALLY DISINTEGRATING ORAL at 16:56

## 2018-03-27 RX ADMIN — FENTANYL CITRATE 50 MCG: 50 INJECTION, SOLUTION INTRAMUSCULAR; INTRAVENOUS at 07:25

## 2018-03-27 RX ADMIN — MIDAZOLAM 2 MG: 1 INJECTION INTRAMUSCULAR; INTRAVENOUS at 07:25

## 2018-03-27 RX ADMIN — CEFAZOLIN SODIUM 2 G: 2 INJECTION, SOLUTION INTRAVENOUS at 16:53

## 2018-03-27 RX ADMIN — ROCURONIUM BROMIDE 10 MG: 10 INJECTION INTRAVENOUS at 08:28

## 2018-03-27 RX ADMIN — METHYLENE BLUE 40 ML: 10 INJECTION INTRAVENOUS at 09:30

## 2018-03-27 RX ADMIN — BUPIVACAINE HYDROCHLORIDE 30 ML: 2.5 INJECTION, SOLUTION EPIDURAL; INFILTRATION; INTRACAUDAL; PERINEURAL at 09:30

## 2018-03-27 RX ADMIN — NEOSTIGMINE METHYLSULFATE 4 MG: 1 INJECTION INTRAMUSCULAR; INTRAVENOUS; SUBCUTANEOUS at 09:41

## 2018-03-27 RX ADMIN — FENTANYL CITRATE 50 MCG: 50 INJECTION, SOLUTION INTRAMUSCULAR; INTRAVENOUS at 09:13

## 2018-03-27 RX ADMIN — ROCURONIUM BROMIDE 10 MG: 10 INJECTION INTRAVENOUS at 07:43

## 2018-03-27 RX ADMIN — SODIUM CHLORIDE, POTASSIUM CHLORIDE, SODIUM LACTATE AND CALCIUM CHLORIDE: 600; 310; 30; 20 INJECTION, SOLUTION INTRAVENOUS at 07:25

## 2018-03-27 RX ADMIN — HYDROMORPHONE HYDROCHLORIDE 0.5 MG: 1 INJECTION, SOLUTION INTRAMUSCULAR; INTRAVENOUS; SUBCUTANEOUS at 10:53

## 2018-03-27 RX ADMIN — SODIUM CHLORIDE, POTASSIUM CHLORIDE, SODIUM LACTATE AND CALCIUM CHLORIDE: 600; 310; 30; 20 INJECTION, SOLUTION INTRAVENOUS at 08:47

## 2018-03-27 RX ADMIN — PROPOFOL 150 MCG/KG/MIN: 10 INJECTION, EMULSION INTRAVENOUS at 07:28

## 2018-03-27 RX ADMIN — FENTANYL CITRATE 50 MCG: 50 INJECTION INTRAMUSCULAR; INTRAVENOUS at 10:37

## 2018-03-27 RX ADMIN — FENTANYL CITRATE 50 MCG: 50 INJECTION INTRAMUSCULAR; INTRAVENOUS at 10:17

## 2018-03-27 RX ADMIN — PROPOFOL 200 MG: 10 INJECTION, EMULSION INTRAVENOUS at 07:27

## 2018-03-27 RX ADMIN — KETOROLAC TROMETHAMINE 30 MG: 30 INJECTION, SOLUTION INTRAMUSCULAR at 10:17

## 2018-03-27 RX ADMIN — HEPARIN SODIUM 350 ML: 1000 INJECTION, SOLUTION INTRAVENOUS; SUBCUTANEOUS at 09:11

## 2018-03-27 RX ADMIN — HYDROMORPHONE HYDROCHLORIDE 0.2 MG: 1 INJECTION, SOLUTION INTRAMUSCULAR; INTRAVENOUS; SUBCUTANEOUS at 12:27

## 2018-03-27 RX ADMIN — ONDANSETRON 4 MG: 2 INJECTION INTRAMUSCULAR; INTRAVENOUS at 10:33

## 2018-03-27 RX ADMIN — LIDOCAINE HYDROCHLORIDE 80 MG: 20 INJECTION, SOLUTION INFILTRATION; PERINEURAL at 07:27

## 2018-03-27 RX ADMIN — PHENYLEPHRINE HYDROCHLORIDE 50 MCG: 10 INJECTION INTRAVENOUS at 09:07

## 2018-03-27 RX ADMIN — DEXMEDETOMIDINE HYDROCHLORIDE 8 MCG: 100 INJECTION, SOLUTION INTRAVENOUS at 08:57

## 2018-03-27 RX ADMIN — GLYCOPYRROLATE 0.6 MG: 0.2 INJECTION, SOLUTION INTRAMUSCULAR; INTRAVENOUS at 09:41

## 2018-03-27 RX ADMIN — ONDANSETRON 4 MG: 2 INJECTION INTRAMUSCULAR; INTRAVENOUS at 09:27

## 2018-03-27 RX ADMIN — ACETAMINOPHEN 1000 MG: 10 INJECTION, SOLUTION INTRAVENOUS at 11:44

## 2018-03-27 RX ADMIN — ROCURONIUM BROMIDE 40 MG: 10 INJECTION INTRAVENOUS at 07:32

## 2018-03-27 RX ADMIN — ROCURONIUM BROMIDE 10 MG: 10 INJECTION INTRAVENOUS at 08:08

## 2018-03-27 RX ADMIN — DEXAMETHASONE SODIUM PHOSPHATE 4 MG: 4 INJECTION, SOLUTION INTRA-ARTICULAR; INTRALESIONAL; INTRAMUSCULAR; INTRAVENOUS; SOFT TISSUE at 07:48

## 2018-03-27 RX ADMIN — PROCHLORPERAZINE MALEATE 10 MG: 10 TABLET, FILM COATED ORAL at 18:25

## 2018-03-27 RX ADMIN — CEFAZOLIN SODIUM 2 G: 2 INJECTION, SOLUTION INTRAVENOUS at 07:26

## 2018-03-27 RX ADMIN — HYDROMORPHONE HYDROCHLORIDE 0.2 MG: 1 INJECTION, SOLUTION INTRAMUSCULAR; INTRAVENOUS; SUBCUTANEOUS at 14:45

## 2018-03-27 RX ADMIN — FENTANYL CITRATE 50 MCG: 50 INJECTION, SOLUTION INTRAMUSCULAR; INTRAVENOUS at 08:36

## 2018-03-27 RX ADMIN — CEFAZOLIN SODIUM 1 G: 2 INJECTION, SOLUTION INTRAVENOUS at 09:26

## 2018-03-27 RX ADMIN — DEXMEDETOMIDINE HYDROCHLORIDE 12 MCG: 100 INJECTION, SOLUTION INTRAVENOUS at 09:12

## 2018-03-27 RX ADMIN — OXYCODONE HYDROCHLORIDE AND ACETAMINOPHEN 2 TABLET: 5; 325 TABLET ORAL at 15:58

## 2018-03-27 RX ADMIN — OXYCODONE HYDROCHLORIDE AND ACETAMINOPHEN 2 TABLET: 5; 325 TABLET ORAL at 20:11

## 2018-03-27 RX ADMIN — PHENYLEPHRINE HYDROCHLORIDE 50 MCG: 10 INJECTION INTRAVENOUS at 09:34

## 2018-03-27 RX ADMIN — ROCURONIUM BROMIDE 10 MG: 10 INJECTION INTRAVENOUS at 08:37

## 2018-03-27 RX ADMIN — PHENYLEPHRINE HYDROCHLORIDE 50 MCG: 10 INJECTION INTRAVENOUS at 09:22

## 2018-03-27 ASSESSMENT — LIFESTYLE VARIABLES: TOBACCO_USE: 1

## 2018-03-27 NOTE — IP AVS SNAPSHOT
[facility] Observation Unit    [address] [place] [id number]    Phone:  [phone]                                       After Visit Summary   3/27/2018    Lillian Cardona    MRN: [id number]           After Visit Summary Signature Page     I have received my discharge instructions, and my questions have been answered. I have discussed any challenges I see with this plan with the nurse or doctor.    ..........................................................................................................................................  Patient/Patient Representative Signature      ..........................................................................................................................................  Patient Representative Print Name and Relationship to Patient    ..................................................               ................................................  Date                                            Time    ..........................................................................................................................................  Reviewed by Signature/Title    ...................................................              ..............................................  Date                                                            Time

## 2018-03-27 NOTE — ANESTHESIA POSTPROCEDURE EVALUATION
Patient: Lillian Cardona    Procedure(s):  LAPAROSCOPY,  LAPAROTOMY FOR BILATERA TUBAL ANASTAMOSIS  - Wound Class: I-Clean    Diagnosis:tubal occlusion   Diagnosis Additional Information: No value filed.    Anesthesia Type:  General    Note:  Anesthesia Post Evaluation    Patient location during evaluation: PACU  Patient participation: Able to fully participate in evaluation  Level of consciousness: awake  Pain management: adequate  Airway patency: patent  Cardiovascular status: acceptable  Respiratory status: acceptable  Hydration status: acceptable  PONV: controlled     Anesthetic complications: None          Last vitals:  Vitals:    03/27/18 0956 03/27/18 1000 03/27/18 1015   BP: 90/59 (!) 88/54 91/57   Resp: 20 22 18   Temp: 35.3  C (95.6  F)  35.9  C (96.6  F)   SpO2: 100% 100% 99%         Electronically Signed By: Easton Browne MD  March 27, 2018  10:25 AM

## 2018-03-27 NOTE — OR NURSING
IV Ofirmev given at 1145. Report called to OBs unit to give report. Aware of patients progress while in PACU. All questions answered. Ready for transfer

## 2018-03-27 NOTE — TELEPHONE ENCOUNTER
"   [facility] Triage Call  Presenting Problem:Yudy.nurse at  [facility] calling\" Lillian is scheduled to be discharged tonight(see epic) and we are looking for an order for Zofran.\"  Patient Recommendations/Teaching:Paged on call Dr. Bellamy([facility]) through page op at 6:17 pm to call Yudy at [phone].  Nohemi Muhmamad RN [facility]            "

## 2018-03-27 NOTE — PROGRESS NOTES
Admission medication history interview status for the 3/27/2018  admission is complete. See EPIC admission navigator for prior to admission medications     Medication history source reliability:Good    Medication history interview source(s):Patient    Medication history resources (including written lists, pill bottles, clinic record):None    Primary pharmacy.N/A    Additional medication history information not noted on PTA med list :None    Time spent in this activity: 40 minutes    Prior to Admission medications    Not on File

## 2018-03-27 NOTE — OP NOTE
Procedure Date: 03/27/2018      OPERATIVE REPORT:        DATE OF PROCEDURE:  03/27/2018        REASON FOR ADMISSION:  Tubal occlusion.      OPERATIVE PROCEDURES:  Laparoscopy, laparotomy, bilateral tubal reanastomosis.      SURGEON:  Beni Bellamy Jr., MD      OPERATIVE FINDINGS:  The patient had clips on both sides and short proximal segments and long distal segments.  There was excellent alignment and free fill and spill on both sides at the end of the case.      DESCRIPTION OF PROCEDURE:  After general anesthesia was induced, the patient was placed in the dorsal lithotomy position and prepped and draped in the usual fashion.  A Balderas catheter was placed.  A uterine manipulator and insufflator was placed.      Through a subumbilical incision, the Veress needle was placed and 3 liters of CO2 insufflated.  The laparoscope, trocar and sheath were placed without incident.  A 5 mm suprapubic trocar was placed under direct vision without complication.  The above findings were noted and the laparoscopy was abandoned for laparotomy.  The subumbilical incision was closed with 3-0 Vicryl.      A small Pfannenstiel incision was made and carried to the peritoneal cavity.  There were some omental adhesions to the anterior parietoperitoneum that were taken down.  The bowel was packed out of the pelvis.  A Silastic wrapped lap was placed to elevate the uterus in the posterior cul-de-sac.      The distal tube was back cannulated with a pediatric feeding tube.  This was crosscut with an Iris scissors.  Cross-cutting of the proximal segment showed a very small caliber ostia.  Silastic stents were backloaded and placed across the anastomosis site.  The mesosalpinx was approximated with 5-0 Vicryl; 6-0 Vicryl were used for serosal to mucosal approximation stitches x 6.  There was excellent approximation and free fill and spill.  An identical procedure was performed on the left.  Of note, there was some scarring of the left fallopian  tube to the ovary with some endometriosis that was removed.  Again, there was free fill and spill.  All Silastic tubing and laps were removed and counts were correct.        Copious irrigation was undertaken, approximately 100 mL and remained in the case to prevent adhesions.      After counts were correct the anterior peritoneum was closed with 2-0 Vicryl,  0 Vicryl was used to close the fascia.  The subcutaneous space was irrigated and closed with 3-0 plain; 4-0 Vicryl was used to close the skin.  Steri-Strips were placed.  The estimated blood loss was less than 15 mL.  The patient tolerated this well and went to the recovery room.        She will be discharged to home before midnight tonight.         JOSH COLINDRES JR, MD             D: 2018   T: 2018   MT: YANE      Name:     ARVIND STOUT   MRN:      [id number]        Account:        [id number]   :      1985           Procedure Date: 2018      Document: [id number]

## 2018-03-27 NOTE — ANESTHESIA CARE TRANSFER NOTE
Patient: Lillian Cardona    Procedure(s):  LAPAROSCOPY,  LAPAROTOMY FOR BILATERA TUBAL ANASTAMOSIS  - Wound Class: I-Clean    Diagnosis: tubal occlusion   Diagnosis Additional Information: No value filed.    Anesthesia Type:   General     Note:  Airway :Face Mask  Patient transferred to:PACU  Comments: Neuromuscular blockade reversed after TOF 4/4, spontaneous respirations, adequate tidal volumes, followed commands to voice, oropharynx suctioned with soft flexible catheter, extubated atraumatically, extubated with suction, airway patent after extubation.  Oxygen via facemask at 10 liters per minute to PACU. Oxygen tubing connected to wall O2 in PACU, SpO2, NiBP, and EKG monitors and alarms on and functioning, Mehrdad Hugger warmer connected to patient gown, report on patient's clinical status given to PACU RN, RN questions answered. Handoff Report: Identifed the Patient, Identified the Reponsible Provider, Reviewed the pertinent medical history, Discussed the surgical course, Reviewed Intra-OP anesthesia mangement and issues during anesthesia, Set expectations for post-procedure period and Allowed opportunity for questions and acknowledgement of understanding      Vitals: (Last set prior to Anesthesia Care Transfer)    CRNA VITALS  3/27/2018 0925 - 3/27/2018 0958      3/27/2018             EKG: Sinus rhythm                Electronically Signed By: NISREEN Trimble CRNA  March 27, 2018  9:58 AM

## 2018-03-27 NOTE — IP AVS SNAPSHOT
MRN:[id number]                      After Visit Summary   3/27/2018    Lillian Cardona    MRN: [id number]           Thank you!     Thank you for choosing [facility] for your care. Our goal is always to provide you with excellent care. Hearing back from our patients is one way we can continue to improve our services. Please take a few minutes to complete the written survey that you may receive in the mail after you visit with us. Thank you!        Patient Information     Date Of Birth          1985        About your hospital stay     You were admitted on:  March 27, 2018 You last received care in the:  [facility]    You were discharged on:  March 27, 2018       Who to Call     For medical emergencies, please call 911.  For non-urgent questions about your medical care, please call your primary care provider or clinic, None  For questions related to your surgery, please call your surgery clinic        Attending Provider     Provider Beni Yepez MD OB/Gyn       Primary Care Provider Fax #    Provider Not In System [phone]      After Care Instructions     Discharge Instructions       Patient to follow up with appointment in 2 weeks                  Further instructions from your care team       Same Day Surgery Discharge Instructions for  Sedation and General Anesthesia       It's not unusual to feel dizzy, light-headed or faint for up to 24 hours after surgery or while taking pain medication.  If you have these symptoms: sit for a few minutes before standing and have someone assist you when you get up to walk or use the bathroom.      You should rest and relax for the next 24 hours. We recommend you make arrangements to have an adult stay with you for at least 24 hours after your discharge.  Avoid hazardous and strenuous activity.      DO NOT DRIVE any vehicle or operate mechanical equipment for 24 hours following the end of your surgery.  Even though you  may feel normal, your reactions may be affected by the medication you have received.      Do not drink alcoholic beverages for 24 hours following surgery.       Slowly progress to your regular diet as you feel able. It's not unusual to feel nauseated and/or vomit after receiving anesthesia.  If you develop these symptoms, drink clear liquids (apple juice, ginger ale, broth, 7-up, etc. ) until you feel better.  If your nausea and vomiting persists for 24 hours, please notify your surgeon.        All narcotic pain medications, along with inactivity and anesthesia, can cause constipation. Drinking plenty of liquids and increasing fiber intake will help.      For any questions of a medical nature, call your surgeon.      Do not make important decisions for 24 hours.      If you had general anesthesia, you may have a sore throat for a couple of days related to the breathing tube used during surgery.  You may use Cepacol lozenges to help with this discomfort.  If it worsens or if you develop a fever, contact your surgeon.       If you feel your pain is not well managed with the pain medications prescribed by your surgeon, please contact your surgeon's office to let them know so they can address your concerns.       HOME CARE FOLLOWING LAPAROSCOPY  [facility]  [phone]      Diet  You have no restrictions on your diet.  During the evening following surgery, drink plenty of fluids and eat a light supper.    Nausea  The anesthesia may produce some nausea.  If you feel nauseated try drinking fluids such as 7-Up, tea, or soup.     Discomfort  The amount of discomfort you can expect is very unpredictable.  If you have pain that cannot be controlled with Tylenol or with the prescription you may have received, you should notify your physician.  The following complaints are not uncommon and should not be cause for concern:  1. Abdominal tenderness; abdominal cramping.  2. Low backache or pain radiating to your  shoulders, chest or back.  This is a result of the gas used to inflate your abdomen during surgery.  Lying flat in bed seems to help relieve this.   3. Sore throat for a day or two resulting from the anesthesia tube used during surgery.   4. Some bruising on your abdomen.     Drainage  You may expect a small amount of drainage from the incision on your abdomen and you may change the bandage when necessary.  You will also have a small amount of vaginal drainage for several days; this is normal and no cause for concern.  If excessive bleeding occurs, notify your physician.      If dye was used during your procedure, your urine will initially be bright blue. It will gradually return to yellow throughout the day. Drinking plenty of fluids will help to filter the dye from your urine.    Fever  A low grade fever (not over 101  Fahrenheit) is usual after this procedure.  Do not hesitate to notify your physician if your fever seems excessive.    Activity  Rest on the day of surgery then you may resume your normal activity, as tolerated. Avoid heavy lifting for one week.    You may shower.  Do not douche or use tampons.  If you also had a D&C, do not resume intercourse until bleeding has ceased.    Emergency Care  Contact your physician if you have any of these problems:   1.  A fever over 101  Fahrenheit   2.  A large amount of bleeding or drainage   3.  Severe pain    **If you have questions or concerns about your procedure,   call Dr. Bellamy at [phone]**    Today you received Toradol, an antiinflammatory medication similar to Ibuprofen.  You should not take other antiinflammatory medication, such as Ibuprofen, Motrin, Advil, Aleve, Naprosyn, etc, until 4:10 pm.     If dye was used during your procedure, your urine will initially be bright blue. It will gradually return to yellow throughout the day. Drinking plenty of fluids will help to filter the dye from your urine.    Pending Results     No orders found from  "3/25/2018 to 3/28/2018.            Admission Information     Date & Time Provider Department Dept. Phone    3/27/2018 Beni Bellamy MD [facility] [phone]      Your Vitals Were     Blood Pressure Temperature Respirations Height Weight Last Period     (BP Location: Right arm) 98.7  F (37.1  C) (Oral) 16 1.626 m (5' 4\") 87.1 kg (192 lb) 2018 (Approximate)    Pulse Oximetry BMI (Body Mass Index)                99% 32.96 kg/m2          MyChart Information     [facility] lets you send messages to your doctor, view your test results, renew your prescriptions, schedule appointments and more. To sign up, go to [email or web address] . Click on \"Log in\" on the left side of the screen, which will take you to the Welcome page. Then click on \"Sign up Now\" on the right side of the page.     You will be asked to enter the access code listed below, as well as some personal information. Please follow the directions to create your username and password.     Your access code is: [email or web address]  Expires: 2018  3:55 PM     Your access code will  in 90 days. If you need help or a new code, please call your [place] clinic or [phone].        Care EveryWhere ID     This is your Care EveryWhere ID. This could be used by other organizations to access your [place] medical records  [id number]        Equal Access to Services     [facility] AH: Hadii kevin cobos hadasho Soomaali, waaxda luqadaha, qaybta kaalmada adeegyada, lefty perez . So [facility] [phone].    ATENCIÓN: Si habla español, tiene a valentin disposición servicios gratuitos de asistencia lingüística. Llame al [phone].    We comply with applicable federal civil rights laws and [place] laws. We do not discriminate on the basis of race, color, national origin, age, disability, sex, sexual orientation, or gender identity.               Review of your medicines      START taking        Dose / Directions    " ondansetron 4 MG tablet   Commonly known as:  ZOFRAN        Dose:  4 mg   Take 1 tablet (4 mg) by mouth every 6 hours as needed for nausea   Quantity:  10 tablet   Refills:  0       oxyCODONE-acetaminophen 5-325 MG per tablet   Commonly known as:  PERCOCET        Dose:  1-2 tablet   Take 1-2 tablets by mouth every 4 hours as needed for pain (moderate to severe)   Quantity:  40 tablet   Refills:  0            Where to get your medicines      These medications were sent to [id number] - [place], [place] - [address] AT [address], [place] [address]    Hours:  Test fax successful 9/6/02   Phone:  [phone]     ondansetron 4 MG tablet         Some of these will need a paper prescription and others can be bought over the counter. Ask your nurse if you have questions.     Bring a paper prescription for each of these medications     oxyCODONE-acetaminophen 5-325 MG per tablet                Protect others around you: Learn how to safely use, store and throw away your medicines at www.disposemymeds.org.        Information about OPIOIDS     PRESCRIPTION OPIOIDS: WHAT YOU NEED TO KNOW    Prescription opioids can be used to help relieve moderate to severe pain and are often prescribed following a surgery or injury, or for certain health conditions. These medications can be an important part of treatment but also come with serious risks. It is important to work with your health care provider to make sure you are getting the safest, most effective care.    WHAT ARE THE RISKS AND SIDE EFFECTS OF OPIOID USE?  Prescription opioids carry serious risks of addiction and overdose, especially with prolonged use. An opioid overdose, often marked by slowed breathing can cause sudden death. The use of prescription opioids can have a number of side effects as well, even when taken as directed:      Tolerance - meaning you might need to take more of a medication for  the same pain relief    Physical dependence - meaning you have symptoms of withdrawal when a medication is stopped    Increased sensitivity to pain    Constipation    Nausea, vomiting, and dry mouth    Sleepiness and dizziness    Confusion    Depression    Low levels of testosterone that can result in lower sex drive, energy, and strength    Itching and sweating    RISKS ARE GREATER WITH:    History of drug misuse, substance use disorder, or overdose    Mental health conditions (such as depression or anxiety)    Sleep apnea    Older age (65 years or older)    Pregnancy    Avoid alcohol while taking prescription opioids.   Also, unless specifically advised by your health care provider, medications to avoid include:    Benzodiazepines (such as Xanax or Valium)    Muscle relaxants (such as Soma or Flexeril)    Hypnotics (such as Ambien or Lunesta)    Other prescription opioids    KNOW YOUR OPTIONS:  Talk to your health care provider about ways to manage your pain that do not involve prescription opioids. Some of these options may actually work better and have fewer risks and side effects:    Pain relievers such as acetaminophen, ibuprofen, and naproxen    Some medications that are also used for depression or seizures    Physical therapy and exercise    Cognitive behavioral therapy, a psychological, goal-directed approach, in which patients learn how to modify physical, behavioral, and emotional triggers of pain and stress    IF YOU ARE PRESCRIBED OPIOIDS FOR PAIN:    Never take opioids in greater amounts or more often than prescribed    Follow up with your primary health care provider and work together to create a plan on how to manage your pain.    Talk about ways to help manage your pain that do not involve prescription opioids    Talk about all concerns and side effects    Help prevent misuse and abuse    Never sell or share prescription opioids    Never use another person's prescription opioids    Store  prescription opioids in a secure place and out of reach of others (this may include visitors, children, friends, and family)    Visit www.cdc.gov/drugoverdose to learn about risks of opioid abuse and overdose    If you believe you may be struggling with addiction, tell your health care provider and ask for guidance or call [facility]'s National Helpline at 8-817-925-HELP    LEARN MORE / www.cdc.gov/drugoverdose/prescribing/guideline.html    Safely dispose of unused prescription opioids: Find your local drug take-back programs and more information about the importance of safe disposal at www.doseofreality.[place].gov             Medication List: This is a list of all your medications and when to take them. Check marks below indicate your daily home schedule. Keep this list as a reference.      Medications           Morning Afternoon Evening Bedtime As Needed    ondansetron 4 MG tablet   Commonly known as:  ZOFRAN   Take 1 tablet (4 mg) by mouth every 6 hours as needed for nausea                                   oxyCODONE-acetaminophen 5-325 MG per tablet   Commonly known as:  PERCOCET   Take 1-2 tablets by mouth every 4 hours as needed for pain (moderate to severe)   Last time this was given:  2 tablets on 3/27/2018  8:11 PM

## 2018-03-27 NOTE — OR NURSING
Spoke with Dr. Browne. Patient BP has been low since admission into PACU ( compared to baseline in preop). Making urine, MAP 60-70. SBP 80's to 90's/ 50's. Mentation WNL. Still having complaints of pain. Instructed to continue with fluids. Will continue to monitor

## 2018-03-27 NOTE — PROVIDER NOTIFICATION
MD Notification    Notified Person: MD    Notified Person Name: Josesito    Notification Date/Time: 06:15pm    Notification Interaction: call page    Purpose of Notification: patient wants prescription for Zofran and toradol in hospital    Orders Received: [facility] Toradol 30mg IV once prn for pain, and outpatient prescription for Zofran 4 mg oral q6h prn (10 tablets)    Comments: Can call Josesito back if can't put in a prescription for Zofran

## 2018-03-27 NOTE — PLAN OF CARE
Problem: Patient Care Overview  Goal: Plan of Care/Patient Progress Review  Outcome: No Change  Pt A&O, BP soft, all other VSS. On RA. 2 incisions, lower abdm incision with scant amount of drainage. Prn dilaudid given x2. Balderas in place, draining. BS hypoactive, tolerating full liquids. CMS intact. nausea improved, had 1x episode of emesis on arrival to unit. /hr. IV antibx ordered. Plans for d/c later this evening, will cont to monitor.

## 2018-03-27 NOTE — OR NURSING
Spoke with anesthesiologist and stated that he is okay with patient going to the OBS unit with her SBP in the 90's. Not sympotomatic, making adequate urine. Pain tolerable, nausea improved. Ready for transfer to the floor.

## 2018-03-27 NOTE — DISCHARGE INSTRUCTIONS
Same Day Surgery Discharge Instructions for  Sedation and General Anesthesia       It's not unusual to feel dizzy, light-headed or faint for up to 24 hours after surgery or while taking pain medication.  If you have these symptoms: sit for a few minutes before standing and have someone assist you when you get up to walk or use the bathroom.      You should rest and relax for the next 24 hours. We recommend you make arrangements to have an adult stay with you for at least 24 hours after your discharge.  Avoid hazardous and strenuous activity.      DO NOT DRIVE any vehicle or operate mechanical equipment for 24 hours following the end of your surgery.  Even though you may feel normal, your reactions may be affected by the medication you have received.      Do not drink alcoholic beverages for 24 hours following surgery.       Slowly progress to your regular diet as you feel able. It's not unusual to feel nauseated and/or vomit after receiving anesthesia.  If you develop these symptoms, drink clear liquids (apple juice, ginger ale, broth, 7-up, etc. ) until you feel better.  If your nausea and vomiting persists for 24 hours, please notify your surgeon.        All narcotic pain medications, along with inactivity and anesthesia, can cause constipation. Drinking plenty of liquids and increasing fiber intake will help.      For any questions of a medical nature, call your surgeon.      Do not make important decisions for 24 hours.      If you had general anesthesia, you may have a sore throat for a couple of days related to the breathing tube used during surgery.  You may use Cepacol lozenges to help with this discomfort.  If it worsens or if you develop a fever, contact your surgeon.       If you feel your pain is not well managed with the pain medications prescribed by your surgeon, please contact your surgeon's office to let them know so they can address your concerns.       HOME CARE FOLLOWING LAPAROSCOPY  [facility]  [phone]      Diet  You have no restrictions on your diet.  During the evening following surgery, drink plenty of fluids and eat a light supper.    Nausea  The anesthesia may produce some nausea.  If you feel nauseated try drinking fluids such as 7-Up, tea, or soup.     Discomfort  The amount of discomfort you can expect is very unpredictable.  If you have pain that cannot be controlled with Tylenol or with the prescription you may have received, you should notify your physician.  The following complaints are not uncommon and should not be cause for concern:  1. Abdominal tenderness; abdominal cramping.  2. Low backache or pain radiating to your shoulders, chest or back.  This is a result of the gas used to inflate your abdomen during surgery.  Lying flat in bed seems to help relieve this.   3. Sore throat for a day or two resulting from the anesthesia tube used during surgery.   4. Some bruising on your abdomen.     Drainage  You may expect a small amount of drainage from the incision on your abdomen and you may change the bandage when necessary.  You will also have a small amount of vaginal drainage for several days; this is normal and no cause for concern.  If excessive bleeding occurs, notify your physician.      If dye was used during your procedure, your urine will initially be bright blue. It will gradually return to yellow throughout the day. Drinking plenty of fluids will help to filter the dye from your urine.    Fever  A low grade fever (not over 101  Fahrenheit) is usual after this procedure.  Do not hesitate to notify your physician if your fever seems excessive.    Activity  Rest on the day of surgery then you may resume your normal activity, as tolerated. Avoid heavy lifting for one week.    You may shower.  Do not douche or use tampons.  If you also had a D&C, do not resume intercourse until bleeding has ceased.    Emergency Care  Contact your physician if you have any of these  problems:   1.  A fever over 101  Fahrenheit   2.  A large amount of bleeding or drainage   3.  Severe pain    **If you have questions or concerns about your procedure,   call Dr. Bellamy at [phone]**    Today you received Toradol, an antiinflammatory medication similar to Ibuprofen.  You should not take other antiinflammatory medication, such as Ibuprofen, Motrin, Advil, Aleve, Naprosyn, etc, until 4:10 pm.     If dye was used during your procedure, your urine will initially be bright blue. It will gradually return to yellow throughout the day. Drinking plenty of fluids will help to filter the dye from your urine.

## 2018-03-27 NOTE — ANESTHESIA PREPROCEDURE EVALUATION
Anesthesia Evaluation     . Pt has had prior anesthetic. Type: General    History of anesthetic complications   - PONV        ROS/MED HX    ENT/Pulmonary:     (+)tobacco use, Past use , . .    Neurologic:       Cardiovascular:         METS/Exercise Tolerance:     Hematologic:         Musculoskeletal:         GI/Hepatic:         Renal/Genitourinary:         Endo:     (+) Obesity, .      Psychiatric:     (+) psychiatric history depression      Infectious Disease:         Malignancy:         Other:                                    Anesthesia Plan      History & Physical Review  History and physical reviewed and following examination; no interval change.    ASA Status:  2 .        Plan for General with Intravenous induction. Maintenance will be TIVA.    PONV prophylaxis:  Ondansetron (or other 5HT-3) and Dexamethasone or Solumedrol  Propofol, Zofran, and decadron      Postoperative Care  Postoperative pain management:  IV analgesics and Oral pain medications.      Consents  Anesthetic plan, risks, benefits and alternatives discussed with:  Patient..                          .

## 2018-03-28 ENCOUNTER — TELEPHONE (OUTPATIENT)
Dept: NURSING | Facility: CLINIC | Age: 33
End: 2018-03-28

## 2018-03-28 NOTE — PLAN OF CARE
Problem: Patient Care Overview  Goal: Plan of Care/Patient Progress Review  Outcome: Improving  A/O, VSS ex soft bp, dizzy when up, percocet and ice for pain, Balderas removed 1820, voiding adequately, zofran and compazine for nausea, abd drainage on dressing marked with no change, steri strip intact CDI, scant vaginal spotting, CMS intact, hypoactive BS, no flatus, IS 2000, plan to d/c this evening.

## 2018-03-28 NOTE — TELEPHONE ENCOUNTER
Called pt back. Encouraged her to try alternating with ibuprofen with her percocet. Limit stairs. Reassurance. Will start stool softener.

## 2018-03-28 NOTE — TELEPHONE ENCOUNTER
Pt calling with concerns that she received conflicting instructions for her post op care- had surgery yesterday. (Laparoscopy, laparotomy, bilateral tubal reanastomosis) She said her pain is very bad- rates at a 7-8...Has had some vaginal bleeding.  Would like to talk to Dr Bellamy or Hanna regarding her concerns. [phone]  Routing to Hanna KIM

## 2018-03-28 NOTE — PLAN OF CARE
Problem: Patient Care Overview  Goal: Plan of Care/Patient Progress Review  Outcome: Improving  Patient has been discharged March 27, 2018 8:52 PM. Discharge instructions and education reviewed. Medication schedule and followup appointments discussed. Patient had no questions. All belongings sent home with patient and fiance.

## 2018-03-30 ENCOUNTER — TELEPHONE (OUTPATIENT)
Dept: NURSING | Facility: CLINIC | Age: 33
End: 2018-03-30

## 2018-03-30 NOTE — TELEPHONE ENCOUNTER
Pt calling in to report that she is having increased bleeding ans passing of clots.  Pt reports that she had a tubal reversal done last Tuesday. SHe says that she fell down a full flight of steps sometime on Wednesday. She had some bleeding and passing of small clots. Today the clots are bigger in size- about grape sized and has a heavy flow. Has not had to change her pad every hour- more like every 2. Rates her pain at a 7 to 8. Is using percocet and ibuprofen. Dr Bellamy not in office until 10am today- Scheduled appt for her to be seen at 1130a- She lives in [facility] but not sure if she can find a ride to clinic. Will discuss with Dr Bellamy when he arrives. Pt understands that if her bleeding increases- and increase of clots and size she should be evaluated in the ER in [facility] close to her home. The pt verbalizes understanding.

## 2018-04-02 ENCOUNTER — TELEPHONE (OUTPATIENT)
Dept: OBGYN | Facility: CLINIC | Age: 33
End: 2018-04-02

## 2018-04-02 DIAGNOSIS — N97.1 TUBAL OCCLUSION: ICD-10-CM

## 2018-04-02 RX ORDER — OXYCODONE AND ACETAMINOPHEN 5; 325 MG/1; MG/1
1-2 TABLET ORAL EVERY 4 HOURS PRN
Qty: 20 TABLET | Refills: 0 | Status: SHIPPED | OUTPATIENT
Start: 2018-04-02

## 2018-04-02 NOTE — TELEPHONE ENCOUNTER
Pt returned call. Pt reports that she is still having bright red bleeding and pain. Pt has been in bed and only up to the bathroom. Pt states when she stands she has some bright red bleeding and is passing small clots about the size of pea or cherry. Pt is still having pain in her incision site and abdomen. Pt is using ice, Ibuprofen, Tylenol, and Percocet. Pt only has a few of the Percocet left and is worried about pain management. Pt not able to see incisional sites, thinks there is some drainage present. Not able to state what type of drainage. Reviewed by Dr. Bellamy. He will call pt.

## 2018-04-02 NOTE — TELEPHONE ENCOUNTER
Pt had surgery for a tubal reversal last Tuesday 3/ 27 and she is still having a lot of pain and bleeding when she stands up.  She has had little activity.  She is almost out of her pain meds and is wondering if she can get a refill on them.  RX- Percocet 5-325, Pharmacy- [facility] off [address] in [place]

## 2018-04-09 ENCOUNTER — OFFICE VISIT (OUTPATIENT)
Dept: OBGYN | Facility: CLINIC | Age: 33
End: 2018-04-09
Payer: COMMERCIAL

## 2018-04-09 VITALS
HEIGHT: 64 IN | SYSTOLIC BLOOD PRESSURE: 112 MMHG | WEIGHT: 192 LBS | HEART RATE: 74 BPM | BODY MASS INDEX: 32.78 KG/M2 | DIASTOLIC BLOOD PRESSURE: 66 MMHG

## 2018-04-09 DIAGNOSIS — Z09 POSTOP CHECK: Primary | ICD-10-CM

## 2018-04-09 PROCEDURE — 99024 POSTOP FOLLOW-UP VISIT: CPT | Performed by: OBSTETRICS & GYNECOLOGY

## 2018-04-09 NOTE — PROGRESS NOTES
The patient is seen at this time for her postoperative check.  Her incision is healing well.  She is still sore but I think that she has been overdoing it.  She is not to attempt pregnancy until after July.  She knows that once she has a positive pregnancy test she needs to be seen immediately for quantitative hCG and early ultrasound as she has an increased risk of ectopic pregnancy.

## 2018-04-09 NOTE — MR AVS SNAPSHOT
"              After Visit Summary   2018    Lillian Cardona    MRN: [id number]           Patient Information     Date Of Birth          1985        Visit Information        Provider Department      2018 11:00 AM Beni Bellamy MD [facility]        Today's Diagnoses     Postop check    -  1       Follow-ups after your visit        Who to contact     If you have questions or need follow up information about today's clinic visit or your schedule please contact [facility] directly at [phone].  Normal or non-critical lab and imaging results will be communicated to you by Seesearchhart, letter or phone within 4 business days after the clinic has received the results. If you do not hear from us within 7 days, please contact the clinic through Seesearchhart or phone. If you have a critical or abnormal lab result, we will notify you by phone as soon as possible.  Submit refill requests through Pixways or call your pharmacy and they will forward the refill request to us. Please allow 3 business days for your refill to be completed.          Additional Information About Your Visit        MyChart Information     Pixways lets you send messages to your doctor, view your test results, renew your prescriptions, schedule appointments and more. To sign up, go to [email or web address] . Click on \"Log in\" on the left side of the screen, which will take you to the Welcome page. Then click on \"Sign up Now\" on the right side of the page.     You will be asked to enter the access code listed below, as well as some personal information. Please follow the directions to create your username and password.     Your access code is: [email or web address]  Expires: 2018  3:55 PM     Your access code will  in 90 days. If you need help or a new code, please call your [facility] clinic or [phone].        Care EveryWhere ID     This is your Care EveryWhere ID. This could be used by other " "organizations to access your [facility] medical records  [id number]        Your Vitals Were     Pulse Height BMI (Body Mass Index)             74 5' 4\" (1.626 m) 32.96 kg/m2          Blood Pressure from Last 3 Encounters:   04/09/18 112/66   03/27/18 98/61   03/13/18 108/72    Weight from Last 3 Encounters:   04/09/18 192 lb (87.1 kg)   03/27/18 192 lb (87.1 kg)   03/13/18 187 lb (84.8 kg)              Today, you had the following     No orders found for display       Primary Care Provider Fax #    Provider Not In System [phone]                Equal Access to Services     [facility] : Parvin Ferrell, paco wheeler, angi berumen, lefty perez . So [facility] [phone].    ATENCIÓN: Si habla español, tiene a valentin disposición servicios gratuitos de asistencia lingüística. Llame al [phone].    We comply with applicable federal civil rights laws and [place] laws. We do not discriminate on the basis of race, color, national origin, age, disability, sex, sexual orientation, or gender identity.            Thank you!     Thank you for choosing [facility]  for your care. Our goal is always to provide you with excellent care. Hearing back from our patients is one way we can continue to improve our services. Please take a few minutes to complete the written survey that you may receive in the mail after your visit with us. Thank you!             Your Updated Medication List - Protect others around you: Learn how to safely use, store and throw away your medicines at www.disposemymeds.org.          This list is accurate as of 4/9/18 11:21 AM.  Always use your most recent med list.                   Brand Name Dispense Instructions for use Diagnosis    IBUPROFEN PO           oxyCODONE-acetaminophen 5-325 MG per tablet    PERCOCET    20 tablet    Take 1-2 tablets by mouth every 4 hours as needed for pain (moderate to severe)    Tubal occlusion    "    TYLENOL PO

## 2018-04-13 ENCOUNTER — TELEPHONE (OUTPATIENT)
Dept: OBGYN | Facility: CLINIC | Age: 33
End: 2018-04-13

## 2018-04-13 DIAGNOSIS — G89.18 POSTOPERATIVE PAIN: Primary | ICD-10-CM

## 2018-04-13 RX ORDER — TRAMADOL HYDROCHLORIDE 50 MG/1
50 TABLET ORAL EVERY 6 HOURS PRN
Qty: 20 TABLET | Refills: 0 | Status: SHIPPED | OUTPATIENT
Start: 2018-04-13

## 2018-04-13 NOTE — TELEPHONE ENCOUNTER
Called pt back. No answer. LM.     Will not rx more percocet. She was told on Monday she needed to back off on activity as that was aggravating her pain.     rx tramadol for her.

## 2018-05-01 ENCOUNTER — TRANSFERRED RECORDS (OUTPATIENT)
Dept: HEALTH INFORMATION MANAGEMENT | Facility: CLINIC | Age: 33
End: 2018-05-01

## 2018-05-01 LAB — PAP SMEAR - HIM PATIENT REPORTED: NEGATIVE

## 2019-02-20 ENCOUNTER — TELEPHONE (OUTPATIENT)
Dept: OBGYN | Facility: CLINIC | Age: 34
End: 2019-02-20

## 2019-02-20 NOTE — TELEPHONE ENCOUNTER
Please abstract the following data from this visit with this patient into the appropriate field in Epic: Pt Reported    Pap smear done on this date: 5/1/2018 (approximately), by this group: [facility], results were NIL.
